# Patient Record
Sex: FEMALE | Race: WHITE
[De-identification: names, ages, dates, MRNs, and addresses within clinical notes are randomized per-mention and may not be internally consistent; named-entity substitution may affect disease eponyms.]

---

## 2017-11-14 ENCOUNTER — HOSPITAL ENCOUNTER (EMERGENCY)
Dept: HOSPITAL 9 - MADERS | Age: 37
Discharge: HOME | End: 2017-11-14
Payer: COMMERCIAL

## 2017-11-14 DIAGNOSIS — R11.2: Primary | ICD-10-CM

## 2017-11-14 DIAGNOSIS — K74.60: ICD-10-CM

## 2017-11-14 DIAGNOSIS — F31.9: ICD-10-CM

## 2017-11-14 DIAGNOSIS — F41.9: ICD-10-CM

## 2017-11-14 DIAGNOSIS — Z79.899: ICD-10-CM

## 2017-11-14 LAB
ALBUMIN SERPL BCG-MCNC: 3.5 G/DL (ref 3.5–5)
ALP SERPL-CCNC: 61 U/L (ref 40–150)
ALT SERPL W P-5'-P-CCNC: 18 U/L (ref 8–55)
AMYLASE SERPL-CCNC: 28 U/L (ref 25–125)
ANION GAP SERPL CALC-SCNC: 12 MMOL/L (ref 10–20)
APAP SERPL-MCNC: (no result) MCG/ML (ref 10–30)
AST SERPL-CCNC: 42 U/L (ref 5–34)
BACTERIA UR QL AUTO: (no result) HPF
BASOPHILS # BLD AUTO: 0 THOU/UL (ref 0–0.2)
BASOPHILS NFR BLD AUTO: 0.6 % (ref 0–1)
BILIRUB DIRECT SERPL-MCNC: 0.6 MG/DL (ref 0.1–0.3)
BILIRUB SERPL-MCNC: 1.5 MG/DL (ref 0.2–1.2)
BUN SERPL-MCNC: 11 MG/DL (ref 7–18.7)
CALCIUM SERPL-MCNC: 8.4 MG/DL (ref 7.8–10.44)
CHLORIDE SERPL-SCNC: 107 MMOL/L (ref 98–107)
CO2 SERPL-SCNC: 23 MMOL/L (ref 22–29)
CREAT CL PREDICTED SERPL C-G-VRATE: 0 ML/MIN (ref 70–130)
DRUG SCREEN CUTOFF: (no result)
EOSINOPHIL # BLD AUTO: 0 THOU/UL (ref 0–0.7)
EOSINOPHIL NFR BLD AUTO: 0.6 % (ref 0–10)
GLUCOSE SERPL-MCNC: 87 MG/DL (ref 70–105)
HGB BLD-MCNC: 12.6 G/DL (ref 12–16)
LIPASE SERPL-CCNC: 5 U/L (ref 8–78)
LYMPHOCYTES # BLD AUTO: 1.8 THOU/UL (ref 1.2–3.4)
LYMPHOCYTES NFR BLD AUTO: 22.1 % (ref 21–51)
MCH RBC QN AUTO: 31 PG (ref 27–31)
MCV RBC AUTO: 90.3 FL (ref 81–99)
MEDTOX CONTROL LINE VALID?: (no result)
MONOCYTES # BLD AUTO: 0.5 THOU/UL (ref 0.11–0.59)
MONOCYTES NFR BLD AUTO: 5.7 % (ref 0–10)
NEUTROPHILS # BLD AUTO: 5.8 THOU/UL (ref 1.4–6.5)
NEUTROPHILS NFR BLD AUTO: 71.1 % (ref 42–75)
PLATELET # BLD AUTO: 272 THOU/UL (ref 130–400)
POTASSIUM SERPL-SCNC: 3.1 MMOL/L (ref 3.5–5.1)
PREGU CONTROL BACKGROUND?: (no result)
PREGU CONTROL BAR APPEAR?: YES
PROT UR STRIP.AUTO-MCNC: 100 MG/DL
RBC # BLD AUTO: 4.08 MILL/UL (ref 4.2–5.4)
RBC UR QL AUTO: (no result) HPF (ref 0–3)
SALICYLATES SERPL-MCNC: (no result) MG/DL (ref 15–30)
SODIUM SERPL-SCNC: 139 MMOL/L (ref 136–145)
SP GR UR STRIP: 1.03 (ref 1–1.04)
WBC # BLD AUTO: 8.1 THOU/UL (ref 4.8–10.8)
WBC UR QL AUTO: (no result) HPF (ref 0–3)

## 2017-11-14 PROCEDURE — 96365 THER/PROPH/DIAG IV INF INIT: CPT

## 2017-11-14 PROCEDURE — 81015 MICROSCOPIC EXAM OF URINE: CPT

## 2017-11-14 PROCEDURE — 82150 ASSAY OF AMYLASE: CPT

## 2017-11-14 PROCEDURE — 81025 URINE PREGNANCY TEST: CPT

## 2017-11-14 PROCEDURE — 80076 HEPATIC FUNCTION PANEL: CPT

## 2017-11-14 PROCEDURE — 85025 COMPLETE CBC W/AUTO DIFF WBC: CPT

## 2017-11-14 PROCEDURE — 96375 TX/PRO/DX INJ NEW DRUG ADDON: CPT

## 2017-11-14 PROCEDURE — 80048 BASIC METABOLIC PNL TOTAL CA: CPT

## 2017-11-14 PROCEDURE — 80306 DRUG TEST PRSMV INSTRMNT: CPT

## 2017-11-14 PROCEDURE — 80307 DRUG TEST PRSMV CHEM ANLYZR: CPT

## 2017-11-14 PROCEDURE — 83690 ASSAY OF LIPASE: CPT

## 2017-11-14 PROCEDURE — 81003 URINALYSIS AUTO W/O SCOPE: CPT

## 2018-07-10 ENCOUNTER — HOSPITAL ENCOUNTER (EMERGENCY)
Dept: HOSPITAL 9 - MADERS | Age: 38
Discharge: HOME | End: 2018-07-10
Payer: SELF-PAY

## 2018-07-10 DIAGNOSIS — R50.9: Primary | ICD-10-CM

## 2018-07-10 DIAGNOSIS — Z79.899: ICD-10-CM

## 2018-07-10 DIAGNOSIS — F17.210: ICD-10-CM

## 2018-07-10 DIAGNOSIS — G43.909: ICD-10-CM

## 2018-07-10 DIAGNOSIS — F31.9: ICD-10-CM

## 2018-07-10 DIAGNOSIS — F41.9: ICD-10-CM

## 2018-07-10 LAB
BACTERIA UR QL AUTO: (no result) HPF
PREGU CONTROL BACKGROUND?: (no result)
PREGU CONTROL BAR APPEAR?: YES
SP GR UR STRIP: 1.01 (ref 1–1.03)
WBC UR QL AUTO: (no result) HPF (ref 0–3)

## 2018-07-10 PROCEDURE — 96372 THER/PROPH/DIAG INJ SC/IM: CPT

## 2018-07-10 PROCEDURE — 81003 URINALYSIS AUTO W/O SCOPE: CPT

## 2018-07-10 PROCEDURE — 81015 MICROSCOPIC EXAM OF URINE: CPT

## 2018-07-10 PROCEDURE — 81025 URINE PREGNANCY TEST: CPT

## 2018-07-10 PROCEDURE — 87804 INFLUENZA ASSAY W/OPTIC: CPT

## 2019-06-15 ENCOUNTER — HOSPITAL ENCOUNTER (INPATIENT)
Dept: HOSPITAL 92 - ERS | Age: 39
LOS: 3 days | Discharge: HOME | DRG: 872 | End: 2019-06-18
Attending: FAMILY MEDICINE | Admitting: FAMILY MEDICINE
Payer: SELF-PAY

## 2019-06-15 VITALS — BODY MASS INDEX: 31.1 KG/M2

## 2019-06-15 DIAGNOSIS — F31.9: ICD-10-CM

## 2019-06-15 DIAGNOSIS — B19.20: ICD-10-CM

## 2019-06-15 DIAGNOSIS — Z98.51: ICD-10-CM

## 2019-06-15 DIAGNOSIS — F41.9: ICD-10-CM

## 2019-06-15 DIAGNOSIS — Z71.6: ICD-10-CM

## 2019-06-15 DIAGNOSIS — K74.60: ICD-10-CM

## 2019-06-15 DIAGNOSIS — N12: ICD-10-CM

## 2019-06-15 DIAGNOSIS — F17.210: ICD-10-CM

## 2019-06-15 DIAGNOSIS — Z88.0: ICD-10-CM

## 2019-06-15 DIAGNOSIS — A41.9: Primary | ICD-10-CM

## 2019-06-15 DIAGNOSIS — Z90.49: ICD-10-CM

## 2019-06-15 DIAGNOSIS — E87.6: ICD-10-CM

## 2019-06-15 PROCEDURE — 96375 TX/PRO/DX INJ NEW DRUG ADDON: CPT

## 2019-06-15 PROCEDURE — 80053 COMPREHEN METABOLIC PANEL: CPT

## 2019-06-15 PROCEDURE — 83735 ASSAY OF MAGNESIUM: CPT

## 2019-06-15 PROCEDURE — 85027 COMPLETE CBC AUTOMATED: CPT

## 2019-06-15 PROCEDURE — 85025 COMPLETE CBC W/AUTO DIFF WBC: CPT

## 2019-06-15 PROCEDURE — 85007 BL SMEAR W/DIFF WBC COUNT: CPT

## 2019-06-15 PROCEDURE — 36415 COLL VENOUS BLD VENIPUNCTURE: CPT

## 2019-06-15 PROCEDURE — 80048 BASIC METABOLIC PNL TOTAL CA: CPT

## 2019-06-15 PROCEDURE — 80306 DRUG TEST PRSMV INSTRMNT: CPT

## 2019-06-15 PROCEDURE — 87086 URINE CULTURE/COLONY COUNT: CPT

## 2019-06-15 PROCEDURE — 80202 ASSAY OF VANCOMYCIN: CPT

## 2019-06-15 PROCEDURE — 96374 THER/PROPH/DIAG INJ IV PUSH: CPT

## 2019-06-16 LAB
ALBUMIN SERPL BCG-MCNC: 2.7 G/DL (ref 3.5–5)
ALP SERPL-CCNC: 151 U/L (ref 40–150)
ALT SERPL W P-5'-P-CCNC: 26 U/L (ref 8–55)
ANION GAP SERPL CALC-SCNC: 12 MMOL/L (ref 10–20)
AST SERPL-CCNC: 23 U/L (ref 5–34)
BILIRUB SERPL-MCNC: 0.6 MG/DL (ref 0.2–1.2)
BUN SERPL-MCNC: 10 MG/DL (ref 7–18.7)
CALCIUM SERPL-MCNC: 8 MG/DL (ref 7.8–10.44)
CHLORIDE SERPL-SCNC: 107 MMOL/L (ref 98–107)
CO2 SERPL-SCNC: 22 MMOL/L (ref 22–29)
CREAT CL PREDICTED SERPL C-G-VRATE: 145 ML/MIN (ref 70–130)
DRUG SCREEN CUTOFF: (no result)
GLOBULIN SER CALC-MCNC: 2.5 G/DL (ref 2.4–3.5)
GLUCOSE SERPL-MCNC: 115 MG/DL (ref 70–105)
HGB BLD-MCNC: 10.8 G/DL (ref 12–16)
MCH RBC QN AUTO: 31.2 PG (ref 27–31)
MCV RBC AUTO: 90.7 FL (ref 78–98)
MDIFF COMPLETE?: YES
MEDTOX CONTROL LINE VALID?: (no result)
MEDTOX READER #: (no result)
PLATELET # BLD AUTO: 124 THOU/UL (ref 130–400)
POTASSIUM SERPL-SCNC: 3.5 MMOL/L (ref 3.5–5.1)
RBC # BLD AUTO: 3.45 MILL/UL (ref 4.2–5.4)
SODIUM SERPL-SCNC: 137 MMOL/L (ref 136–145)
WBC # BLD AUTO: 15.2 THOU/UL (ref 4.8–10.8)

## 2019-06-16 RX ADMIN — HYDROCODONE BITARTRATE AND ACETAMINOPHEN PRN TAB: 5; 325 TABLET ORAL at 10:45

## 2019-06-16 RX ADMIN — HYDROCODONE BITARTRATE AND ACETAMINOPHEN PRN TAB: 5; 325 TABLET ORAL at 16:19

## 2019-06-16 RX ADMIN — VANCOMYCIN HYDROCHLORIDE SCH MLS: 1 INJECTION, POWDER, LYOPHILIZED, FOR SOLUTION INTRAVENOUS at 16:18

## 2019-06-16 RX ADMIN — HYDROCODONE BITARTRATE AND ACETAMINOPHEN PRN TAB: 5; 325 TABLET ORAL at 21:10

## 2019-06-16 RX ADMIN — VANCOMYCIN HYDROCHLORIDE SCH MLS: 1 INJECTION, POWDER, LYOPHILIZED, FOR SOLUTION INTRAVENOUS at 03:22

## 2019-06-16 RX ADMIN — Medication SCH ML: at 20:25

## 2019-06-16 RX ADMIN — Medication SCH ML: at 07:35

## 2019-06-16 NOTE — HP
PRIMARY CARE PROVIDER:  ENEIDA Ty



CHIEF COMPLAINT:  General body aches and flank pain.



HISTORY OF PRESENT ILLNESS:  This is a 38-year-old  female who initially

presented to an Emergency Department in [place], [place] on 06/14/2019, complaining

of the above symptoms, diagnosed with urinary tract infection and placed on

antibiotics.  The patient states she took 1 dose of the antibiotic, however,

continued to have fevers up to 103 degrees Fahrenheit with body aches, flank pain,

and cloudy urine.  The patient states she does not get frequent urinary tract

infections and thinks her last urinary tract infection was approximately 20 years

prior to this evaluation.  The patient denied any blood in the urine or change to

her bowel habits.  The patient denied any recent instrumentation, travel history, or

family members with similar symptoms.  The patient admits to pain in the bilateral

flank region with fever and difficulty passing urine.  The patient was evaluated at

[facility] on 06/15/2019, and underwent CT imaging of the

abdomen and pelvis showing bilateral perinephric stranding concerning for

pyelonephritis.  The patient also met sepsis criteria with fever and leukocytosis

and treated for sepsis due to urinary tract infection with IV Rocephin, Toradol, and

morphine sulfate.  The patient was transferred to [facility] Emergency

Department for further evaluation. 



PAST MEDICAL HISTORY:  

1. Bipolar disorder.

2. Anxiety disorder.

3. Tobacco abuse.

4. Hepatitis C with cirrhosis.

5. Migraine headaches.

6. Chronic sinusitis.

7. Polysubstance abuse by history.



PAST SURGICAL HISTORY:  

1. Status post bilateral tubal ligation.

2. Status post cholecystectomy.

3. Status post EGD/colonoscopy.

4. Status post liver biopsy.



CURRENT MEDICATIONS:  

1. Seroquel 300 mg p.o. daily.  

2. Xanax 2 mg p.o. daily.  

3. Fiorinal 50 mg/325 mg/40 mg one tablet p.o. q.6 hours p.r.n. headache.



ALLERGIES:  PENICILLIN.



FAMILY HISTORY:  No inheritable diseases per patient report.



SOCIAL HISTORY:  The patient resides outside of [place], [place].  Smokes up to a

pack of cigarettes daily.  Former drug use to include heroin, methamphetamines, and

opiates.  No alcohol use.  Accompanied by her mother in the emergency department. 



REVIEW OF SYSTEMS:  CONSTITUTIONAL:  Negative for weight loss or gain, ability to

conduct usual activities. 

SKIN:  Negative for rash, itching. 

EYES:  Negative for double vision, pain. 

ENT/MOUTH:  Negative for nose bleeding, neck stiffness, pain, tenderness. 

CARDIOVASCULAR:  Negative for palpitations, dyspnea on exertion, orthopnea. 

RESPIRATORY:  Negative for shortness of breath, wheezing, cough, hemoptysis, fever

or night sweats. 

GASTROINTESTINAL:  Negative for poor appetite, abdominal pain, heartburn, nausea,

vomiting, constipation, or diarrhea. 

GENITOURINARY:  Negative for urgency, frequency, dysuria, nocturia. 

MUSCULOSKELETAL:  Negative for pain, swelling. 

NEUROLOGIC/PSYCHIATRIC:  Negative for anxiety, depression. 

ALLERGY/IMMUNOLOGIC:  Negative for skin rash, bleeding tendency. 



Otherwise, negative except as stated per HPI.



PHYSICAL EXAMINATION:

VITAL SIGNS:  On admission; blood pressure 101/66, pulse 86, respiratory rate 20,

temperature 98.6 degrees Fahrenheit, O2 saturation 98% on room air. 

GENERAL APPEARANCE:  This is a 38-year-old  female, appears older than

stated age.  Alert and oriented x3, pleasant, in mild to moderate distress. 

HEENT:  Pupils are equal, round, and reactive to light and accommodation.

Extraocular muscles are intact.  No scleral icterus.  No conjunctival injection.

Nares patent.  OP is clear.  Oral mucosa is dry appearing. 

NECK:  Supple.  No cervical adenopathy.  No thyromegaly.  No carotid bruits.  No JVD

appreciated.  Cervical spine with full active and passive range of motion.  No

meningeal signs noted. 

CHEST:  Diminished breath sounds in the bases bilaterally.  Occasional expiratory

wheeze noted. 

CARDIOVASCULAR:  S1 and S2 without noted murmur, rub, or gallop.  ABDOMEN:  Rounded,

soft with mild tenderness to palpation in the bilateral flank region.  No palpable

mass.  No rebound or guarding noted.  Bowel sounds are positive in all 4 quadrants. 

EXTREMITIES:  Warm and dry with fair turgor.  No clubbing, cyanosis, or asymmetric

edema appreciated.  Pulses are palpable distally at the dorsalis pedis, posterior

tibial, and popliteal arteries bilaterally.  Capillary refill less than 2 seconds. 

NEUROLOGIC:  Cranial nerves II through XII are grossly intact.  No focal or

lateralizing signs appreciated. 



PERTINENT LAB AND X-RAY FINDINGS:  Basic metabolic profile within normal limits.

Lactic acid level 1.2.  AST 41, ALT of 34, alkaline phosphatase 187, albumin 3.1,

lipase 4.  CBC showed white blood cell count of 14.6, hemoglobin 12.6, hematocrit

37.3, platelet count 92 with 81% neutrophilia.  Urinalysis positive for protein,

ketones, and trace leukocyte esterase with greater than 50 to [id number]

wbc's per high-power field. 



ASSESSMENT AND PLAN:  

1. Sepsis secondary to pyelonephritis.  The patient will be admitted to the medical

floor.  We will continue general sepsis protocol.  Continue treatment as outlined in

#2.  Continue intravenous normal saline at 125 mL/h. 

2. Pyelonephritis suspected given the patient's CT imaging findings.  We will

continue Rocephin 2 g IV q.24 hours with additional vancomycin 1 g IV q.12 hours.

Urine and blood cultures are pending.  Pain control with Toradol 30 mg IV q.6 hours. 

3. Neutrophilic leukocytosis, secondary to #2.  We will repeat CBC in the a.m. and

continue management as outlined previously. 

4. Hepatitis C with cirrhosis, compensated currently.  We will continue serial

monitoring and monitor LFTs during the hospital course. 

5. Tobacco abuse.  Smoking cessation resources prior to discharge.

6. Bipolar disorder.  Resume Seroquel 300 mg daily with additional Xanax 2 mg daily.

7. Prophylaxis.  Sequential compression devices while in bed.  Pepcid 20 mg p.o.

b.i.d. 



CODE STATUS:  Full.  Surrogate medical decision maker is patient's mother.







Job ID:  [id number]

## 2019-06-17 LAB
ALBUMIN SERPL BCG-MCNC: 2.7 G/DL (ref 3.5–5)
ALP SERPL-CCNC: 172 U/L (ref 40–150)
ALT SERPL W P-5'-P-CCNC: 22 U/L (ref 8–55)
ANION GAP SERPL CALC-SCNC: 9 MMOL/L (ref 10–20)
AST SERPL-CCNC: 15 U/L (ref 5–34)
BASOPHILS # BLD AUTO: 0 THOU/UL (ref 0–0.2)
BASOPHILS NFR BLD AUTO: 0.3 % (ref 0–1)
BILIRUB SERPL-MCNC: 0.3 MG/DL (ref 0.2–1.2)
BUN SERPL-MCNC: 13 MG/DL (ref 7–18.7)
CALCIUM SERPL-MCNC: 8.1 MG/DL (ref 7.8–10.44)
CHLORIDE SERPL-SCNC: 112 MMOL/L (ref 98–107)
CO2 SERPL-SCNC: 21 MMOL/L (ref 22–29)
CREAT CL PREDICTED SERPL C-G-VRATE: 142 ML/MIN (ref 70–130)
DRUG SCREEN CUTOFF: (no result)
EOSINOPHIL # BLD AUTO: 0.2 THOU/UL (ref 0–0.7)
EOSINOPHIL NFR BLD AUTO: 1.3 % (ref 0–10)
GLOBULIN SER CALC-MCNC: 2.6 G/DL (ref 2.4–3.5)
GLUCOSE SERPL-MCNC: 146 MG/DL (ref 70–105)
HGB BLD-MCNC: 10.2 G/DL (ref 12–16)
LYMPHOCYTES # BLD: 2.4 THOU/UL (ref 1.2–3.4)
LYMPHOCYTES NFR BLD AUTO: 20.4 % (ref 21–51)
MAGNESIUM SERPL-MCNC: 1.6 MG/DL (ref 1.6–2.6)
MCH RBC QN AUTO: 29.7 PG (ref 27–31)
MCV RBC AUTO: 92.4 FL (ref 78–98)
MEDTOX CONTROL LINE VALID?: (no result)
MEDTOX READER #: (no result)
MONOCYTES # BLD AUTO: 1.1 THOU/UL (ref 0.11–0.59)
MONOCYTES NFR BLD AUTO: 9.3 % (ref 0–10)
NEUTROPHILS # BLD AUTO: 8.1 THOU/UL (ref 1.4–6.5)
NEUTROPHILS NFR BLD AUTO: 68.7 % (ref 42–75)
PLATELET # BLD AUTO: 117 THOU/UL (ref 130–400)
POTASSIUM SERPL-SCNC: 3.2 MMOL/L (ref 3.5–5.1)
RBC # BLD AUTO: 3.43 MILL/UL (ref 4.2–5.4)
SODIUM SERPL-SCNC: 139 MMOL/L (ref 136–145)
VANCOMYCIN TROUGH SERPL-MCNC: 11.5 UG/ML
WBC # BLD AUTO: 11.8 THOU/UL (ref 4.8–10.8)

## 2019-06-17 RX ADMIN — VANCOMYCIN HYDROCHLORIDE SCH: 1 INJECTION, POWDER, LYOPHILIZED, FOR SOLUTION INTRAVENOUS at 15:43

## 2019-06-17 RX ADMIN — Medication SCH: at 09:28

## 2019-06-17 RX ADMIN — HYDROCODONE BITARTRATE AND ACETAMINOPHEN PRN TAB: 5; 325 TABLET ORAL at 03:28

## 2019-06-17 RX ADMIN — Medication SCH: at 21:06

## 2019-06-17 RX ADMIN — HYDROCODONE BITARTRATE AND ACETAMINOPHEN PRN TAB: 5; 325 TABLET ORAL at 09:25

## 2019-06-17 RX ADMIN — SULFAMETHOXAZOLE AND TRIMETHOPRIM SCH TAB: 800; 160 TABLET ORAL at 20:47

## 2019-06-17 RX ADMIN — VANCOMYCIN HYDROCHLORIDE SCH MLS: 1 INJECTION, POWDER, LYOPHILIZED, FOR SOLUTION INTRAVENOUS at 03:28

## 2019-06-17 NOTE — PDOC.PN
- Subjective


Encounter Start Date: 06/17/19


Encounter Start Time: 07:20





Pt seen for followup re: UTI.  Says she feels better.  





- Objective


Resuscitation Status - Order Detail:





06/16/19 01:38


Resuscitation Status Routine 


   Resuscitation Status: FULL: Full Resuscitation








MAR Reviewed: Yes


Vital Signs & Weight: 


 Vital Signs (12 hours)











  Temp Pulse Resp BP Pulse Ox


 


 06/17/19 12:00  97.5 F L  71  12  111/72  94 L


 


 06/17/19 08:00  97.4 F L  74  12  133/86  100


 


 06/17/19 03:38  97.9 F  87  16  118/80  99








 Weight











Weight                         175 lb 9.6 oz














I&O: 


 











 06/16/19 06/17/19 06/18/19





 06:59 06:59 06:59


 


Intake Total 1338 1300 


 


Output Total 400 200 


 


Balance 938 1100 











Result Diagrams: 


 06/17/19 05:10





 06/17/19 05:10


Additional Labs: 





Labs reviewed by me





Phys Exam





- Physical Examination


Obese


HEENT: moist MMs, sclera anicteric, oral pharynx no lesions, 2+ tonsils


Neck: no nodes, no JVD, supple, full ROM


Respiratory: clear to auscultation bilateral


Cardiovascular: RRR, no rub


Gastrointestinal: soft, non-tender, no distention, positive bowel sounds


Neurological: moves all 4 limbs


Psychiatric: normal affect, A&O x 3





Dx/Plan


(1) UTI (urinary tract infection)


Status: Acute   Comment: switch to oral Bactrim DS for antibiotics, final urine 

culture is negative   





(2) Hypokalemia


Code(s): E87.6 - HYPOKALEMIA   Status: Acute   Comment: replace potassium   





(3) Bipolar disorder


Code(s): F31.9 - BIPOLAR DISORDER, UNSPECIFIED   Status: Chronic   Comment: 

stable, continue home medications   





- Plan





* .








Review of Systems





- Review of Systems


Constitutional: negative: fever, chills, sweats, weakness, malaise


Respiratory: negative: Cough, Shortness of Breath, SOB with Excertion, 

Pleuritic Pain, Wheezing


Cardiovascular: negative: chest pain, palpitations, orthopnea, paroxysmal 

nocturnal dyspnea, edema, light headedness


Gastrointestinal: negative: Nausea, Vomiting, Abdominal Pain, Diarrhea, 

Constipation, Melena, Hematochezia


Genitourinary: negative: Dysuria, Frequency, Incontinence, Hematuria, Retention


Musculoskeletal: Back Pain


Skin: negative: Rash, Lesions, Luis Alberto, Bruising





- Medications/Allergies


Allergies/Adverse Reactions: 


 Allergies











Allergy/AdvReac Type Severity Reaction Status Date / Time


 


Penicillins Allergy Intermediate Hives Verified 06/16/19 02:19











Medications: 


 Current Medications





Acetaminophen (Tylenol)  1,000 mg PO Q6H PRN


   PRN Reason: Mild Pain (1-3)


Alprazolam (Xanax)  2 mg PO HS [facility]


   Last Admin: 06/16/19 21:10 Dose:  2 mg


Famotidine (Pepcid)  20 mg PO BID [facility]


   Last Admin: 06/17/19 09:25 Dose:  20 mg


Hydralazine HCl (Apresoline)  10 mg SLOW IVP Q4H PRN


   PRN Reason: SBP > 180 and HR < 70


Ceftriaxone Sodium 2 gm/ (Sodium Chloride)  100 mls @ 200 mls/hr IVPB Q24HR [facility]


   Last Admin: 06/16/19 22:18 Dose:  100 mls


Sodium Chloride (Normal Saline 0.9%)  1,000 mls @ 125 mls/hr IV .Q8H [facility]


   Last Admin: 06/17/19 06:27 Dose:  1,000 mls


Vancomycin HCl 1.25 gm/ Sodium (Chloride)  250 mls @ 166.667 mls/hr IVPB 0300,

1500 [facility]


   Last Admin: 06/17/19 03:28 Dose:  250 mls


Ibuprofen (Motrin)  400 mg PO Q4H PRN


   PRN Reason: Fever > 101


Ketorolac Tromethamine (Toradol)  30 mg IVP Q6HR [facility]


   Stop: 06/21/19 06:01


   Last Admin: 06/17/19 11:28 Dose:  30 mg


Miscellaneous Medication (Pharmacy To Dose)  1 each IVPB PRN PRN


   PRN Reason: .


Nicotine (Nicoderm Patch)  14 mg TOP Q24HR [facility]


   Last Admin: 06/16/19 22:18 Dose:  14 mg


Non-Formulary Medication (Omeprazole [Omeprazole])  40 mg PO DAILY [facility]


Ondansetron HCl (Zofran Odt)  4 mg PO Q6H PRN


   PRN Reason: Nausea/Vomiting


Ondansetron HCl (Zofran)  4 mg IVP Q6H PRN


   PRN Reason: Nausea/Vomiting


Quetiapine Fumarate (Seroquel)  100 mg PO HS [facility]


   Last Admin: 06/16/19 20:22 Dose:  100 mg


Quetiapine Fumarate (Seroquel)  200 mg PO QAM [facility]


   Last Admin: 06/17/19 09:25 Dose:  200 mg


Quetiapine Fumarate (Seroquel)  100 mg PO HS [facility]


Quetiapine Fumarate (Seroquel)  200 mg PO DAILY [facility]


Saccharomyces Boulardii (Florastor)  250 mg PO HS [facility]


   Last Admin: 06/16/19 20:22 Dose:  250 mg


Sodium Chloride (Flush - Normal Saline)  10 ml IVF Q12HR [facility]


   Last Admin: 06/17/19 09:28 Dose:  Not Given


Sodium Chloride (Flush - Normal Saline)  10 ml IVF PRN PRN


   PRN Reason: Saline Flush


   Last Admin: 06/16/19 03:22 Dose:  10 ml

## 2019-06-18 VITALS — TEMPERATURE: 97.8 F | SYSTOLIC BLOOD PRESSURE: 143 MMHG | DIASTOLIC BLOOD PRESSURE: 84 MMHG

## 2019-06-18 LAB
ANION GAP SERPL CALC-SCNC: 10 MMOL/L (ref 10–20)
BASOPHILS # BLD AUTO: 0 THOU/UL (ref 0–0.2)
BASOPHILS NFR BLD AUTO: 0.2 % (ref 0–1)
BUN SERPL-MCNC: 8 MG/DL (ref 7–18.7)
CALCIUM SERPL-MCNC: 8.3 MG/DL (ref 7.8–10.44)
CHLORIDE SERPL-SCNC: 112 MMOL/L (ref 98–107)
CO2 SERPL-SCNC: 20 MMOL/L (ref 22–29)
CREAT CL PREDICTED SERPL C-G-VRATE: 138 ML/MIN (ref 70–130)
EOSINOPHIL # BLD AUTO: 0.2 THOU/UL (ref 0–0.7)
EOSINOPHIL NFR BLD AUTO: 2.1 % (ref 0–10)
GLUCOSE SERPL-MCNC: 103 MG/DL (ref 70–105)
HGB BLD-MCNC: 10 G/DL (ref 12–16)
LYMPHOCYTES # BLD: 2.2 THOU/UL (ref 1.2–3.4)
LYMPHOCYTES NFR BLD AUTO: 28.8 % (ref 21–51)
MCH RBC QN AUTO: 30.8 PG (ref 27–31)
MCV RBC AUTO: 92.1 FL (ref 78–98)
MONOCYTES # BLD AUTO: 0.7 THOU/UL (ref 0.11–0.59)
MONOCYTES NFR BLD AUTO: 8.8 % (ref 0–10)
NEUTROPHILS # BLD AUTO: 4.7 THOU/UL (ref 1.4–6.5)
NEUTROPHILS NFR BLD AUTO: 60.1 % (ref 42–75)
PLATELET # BLD AUTO: 222 THOU/UL (ref 130–400)
POTASSIUM SERPL-SCNC: 3.3 MMOL/L (ref 3.5–5.1)
RBC # BLD AUTO: 3.24 MILL/UL (ref 4.2–5.4)
SODIUM SERPL-SCNC: 139 MMOL/L (ref 136–145)
WBC # BLD AUTO: 7.8 THOU/UL (ref 4.8–10.8)

## 2019-06-18 RX ADMIN — Medication SCH: at 09:15

## 2019-06-18 RX ADMIN — SULFAMETHOXAZOLE AND TRIMETHOPRIM SCH TAB: 800; 160 TABLET ORAL at 09:17

## 2019-06-18 NOTE — DIS
DATE OF ADMISSION:  06/16/2019



DATE OF DISCHARGE:  06/18/2019



PRIMARY CARE PROVIDER:  Domitila Stallings.



DISCHARGE DIAGNOSES:  

1. Sepsis.

2. Pyelonephritis.



CONDITION OF PATIENT ON THE DAY OF DISCHARGE:  Stable.  I assessed Ms. Valvered on

the day of discharge.  She denies any chest pain or shortness of breath.  Vital

signs are stable.  S1 and S2 are heard, regular.  Lungs are clear to auscultation

bilaterally. 



DISCHARGE MEDICATIONS:  

1. Xanax 2 mg at bedtime.

2. Omeprazole 40 mg daily.

3. Seroquel 200 mg in the morning and 100 mg at bedtime.

4. Florastor 250 mg at bedtime for 12 days.

5. Bactrim DS one tablet two times a day for 12 days.



HOSPITAL COURSE:  Ms. Valverde is a pleasant 38-year-old lady who was admitted to

[facility] for sepsis and pyelonephritis on June 16, 2019.

Please refer to Dr. Quintana's history and physical note dated June 16, 2019, for

further details. 



She was treated with intravenous antibiotics and improved clinically.  Final urine

culture did not show any growth at 36 hours.  She is being discharged home on

empiric antibiotics in the form of Bactrim Double Strength one tablet 2 times a day. 



She is advised to follow up with her primary care provider in 3 to 5 days time.  She

is also advised to have her chem-7 checked at that time to ensure stability of

potassium and creatinine. 



On the day of discharge, Ms. Valverde has white count of 7800, hemoglobin 10,

platelet count 222,000, sodium 139, potassium 3.3, and creatinine 0.68. 



Please note that she had elevated alkaline phosphatase level during this

hospitalization.  This may need further workup as outpatient. 



Many thanks for allowing me to participate in your patient's care.  Please feel free

to contact me with any questions or concerns. 



DISCHARGE DESTINATION:  Home.



TIME SPENT:  Total amount of time spent coordinating this discharge:  33 minutes.







Job ID:  [id number]

## 2019-12-04 ENCOUNTER — HOSPITAL ENCOUNTER (EMERGENCY)
Dept: HOSPITAL 92 - ERS | Age: 39
LOS: 1 days | Discharge: HOME | End: 2019-12-05
Payer: SELF-PAY

## 2019-12-04 DIAGNOSIS — F41.9: ICD-10-CM

## 2019-12-04 DIAGNOSIS — F17.210: ICD-10-CM

## 2019-12-04 DIAGNOSIS — M51.26: Primary | ICD-10-CM

## 2019-12-04 DIAGNOSIS — Z79.899: ICD-10-CM

## 2019-12-04 DIAGNOSIS — F31.9: ICD-10-CM

## 2019-12-04 DIAGNOSIS — Z86.19: ICD-10-CM

## 2019-12-04 DIAGNOSIS — K74.60: ICD-10-CM

## 2019-12-04 PROCEDURE — 72141 MRI NECK SPINE W/O DYE: CPT

## 2019-12-04 PROCEDURE — 96361 HYDRATE IV INFUSION ADD-ON: CPT

## 2019-12-04 PROCEDURE — 72146 MRI CHEST SPINE W/O DYE: CPT

## 2019-12-04 PROCEDURE — 96374 THER/PROPH/DIAG INJ IV PUSH: CPT

## 2019-12-04 PROCEDURE — 72148 MRI LUMBAR SPINE W/O DYE: CPT

## 2019-12-04 PROCEDURE — 96375 TX/PRO/DX INJ NEW DRUG ADDON: CPT

## 2019-12-04 NOTE — MRI
Exam: Thoracic spine MRI without contrast



HISTORY: Acute pain. IV drug use. Evaluate for epidural abscess.



COMPARISON: None



FINDINGS:

Appropriate T1 marrow signal intensity of the thoracic vertebra. Thoracic spine vertebral body height
 is maintained. No fracture. No significant STIR hyperintensity to suggest vertebral body edema or

ligamentous injury

Visualized mediastinum, lung parenchyma and solid organs are grossly unremarkable.

The thoracic cord has a normal size and signal intensity. No cord expansion. No cord malacia. No abno
rmal T2 hyperintensity. Conus medullaris terminates at the T12-L1 disc space.

T4-T5: Minimal central disc protrusion. Minimal central canal stenosis

T7-T8: Central/right paracentral disc protrusion. Contact upon the thoracic cord with deformity the t
horacic cord. Mild to moderate central canal stenosis. No cord hyperintensity.

Throughout the thoracic spine, the neural foramina are patent



IMPRESSION:

1. No abnormal signal intensity of the vertebrae.

2. Degenerative disc disease at T4-T5 and T7-T8. There is mass effect upon the right hemicord at T7-T
8 without cord signal abnormality.

3. No abnormal signal intensities to suggest epidural abscess.



Transcribed Date/Time: 12/4/2019 11:48 PM



Reported By: Edna Thornton 

Electronically Signed:  12/4/2019 11:49 PM

## 2019-12-04 NOTE — MRI
MRI lumbar spine noncontrast:



HISTORY:

Acute pain. History of IV drug use. Evaluate for epidural abscess.



COMPARISON:

None



FINDINGS:



Appropriate T1 marrow signal intensity of the lumbar vertebrae. Lumbar spine vertebral body height is
 maintained. No fracture. No significant STIR hyperintensity to suggest vertebral body edema or

ligamentous injury.

Appropriate signal intensity of the  visualized paraspinal muscles.

T2 hyperintensity in the left renal cortex likely representing a 1.2 cm cyst

Conus medullaris terminates at the T12-L1 disc space



T12-L1:Adequate disc hydration. No significant central canal stenosis or significant neural foraminal
 narrowing



L1-L2:Adequate disc hydration. No significant central canal stenosis or significant neural foraminal 
narrowing



L2-L3:Adequate disc hydration. No significant central canal stenosis or significant neural foraminal 
narrowing



L3-L4:Adequate disc hydration. Broad-based disc bulge, minimal ligament flavum thickening and facet h
ypertrophy result in mild central canal stenosis. Bilaterally the neural foramina are patent



L4-L5: Desiccation : with mild loss of disc space height. There appears to be a central and  left sub
articular disc herniation with associated T2 hyperintensity. T2 hyperintensity may in part

represent an annular fissure. The presence of T2 hyperintensity involving the herniated disc material
 does raises the possibility acute herniation. Ligament flavum thickening and facet hypertrophy are

present. Overall there is moderate central canal stenosis. Disc material does extend into the right s
ubarticular zone and abuts the traversing right L5 nerve root. There is near complete obscuration

of the traversing left L5 nerve root due to disc material. Mild bilateral neural foraminal narrowing 
due to disc material.



L5-S1:Adequate disc hydration. No significant central canal stenosis or significant neural foraminal 
narrowing



Incidental S2 Tarlov cyst.



IMPRESSION:

1. Annular fissure with acute herniation at L4-L5. Moderate central canal stenosis. Mass effect witho
ut obscuration of the traversing right L5 nerve root. Mass effect and near complete obscuration the

traversing left L5 nerve root.

2. No evidence of epidural abscess.



Transcribed Date/Time: 12/4/2019 11:47 PM



Reported By: Edna Thornton 

Electronically Signed:  12/4/2019 11:47 PM

## 2019-12-04 NOTE — MRI
Exam: MRI cervical spine without contrast



HISTORY: Pain. Evaluate for epidural abscess. IV drug abuse..



COMPARISON: None



FINDINGS:

 Appropriate T1 marrow signal intensity of the cervical vertebrae. Vertebral body height is maintaine
d. No fracture. No significant STIR hyperintensity to suggest vertebral body edema or ligamentous

injury. Straightening of normal cervical lordosis is presumed be positional



Visualized brain parenchyma, cervicomedullary junction, cervical cord and the upper thoracic cord hav
e a normal size and signal intensity.



C2-C3: Central disc protrusion. No significant central canal stenosis or significant neural foraminal
 narrowing



C3-C4: Broad-based disc bulge with a small central disc protrusion. No significant central canal sten
osis or significant neural foraminal narrowing.



C4-C5:Broad-based disc  osteophyte complex abuts the thecal sac and nearly effaces the ventral subara
chnoid space. Mild central canal stenosis. Mild bilateral foraminal narrowing due to uncovertebral

hypertrophy



C5-C6: Broad-based disc osteophyte complex abuts the thecal sac and nearly effaces the ventral subara
chnoid space. Mild central canal stenosis. Minimal bilateral foraminal narrowing due to

uncovertebral hypertrophy



C6-C7: Broad-based discussed by complex nearly effaces subarachnoid space. Mild central canal stenosi
s. Right neural foramen is patent. Mild left foraminal narrowing due to uncovertebral hypertrophy



C7-T1: No significant central canal stenosis or significant neural foraminal narrowing



IMPRESSION:

 1. Degenerative changes of the cervical spine as described above

2. Appropriate marrow signal intensity of the cervical vertebra.

3. No evidence of epidural abscess..



Transcribed Date/Time: 12/4/2019 11:51 PM



Reported By: Edna Thornton 

Electronically Signed:  12/4/2019 11:56 PM

## 2020-03-17 ENCOUNTER — HOSPITAL ENCOUNTER (INPATIENT)
Dept: HOSPITAL 92 - ERS | Age: 40
LOS: 1 days | Discharge: HOME | DRG: 872 | End: 2020-03-18
Attending: INTERNAL MEDICINE | Admitting: INTERNAL MEDICINE
Payer: SELF-PAY

## 2020-03-17 VITALS — BODY MASS INDEX: 15.9 KG/M2

## 2020-03-17 DIAGNOSIS — I10: ICD-10-CM

## 2020-03-17 DIAGNOSIS — K74.60: ICD-10-CM

## 2020-03-17 DIAGNOSIS — F15.10: ICD-10-CM

## 2020-03-17 DIAGNOSIS — Z98.51: ICD-10-CM

## 2020-03-17 DIAGNOSIS — Z88.0: ICD-10-CM

## 2020-03-17 DIAGNOSIS — F11.10: ICD-10-CM

## 2020-03-17 DIAGNOSIS — Z71.6: ICD-10-CM

## 2020-03-17 DIAGNOSIS — N39.0: ICD-10-CM

## 2020-03-17 DIAGNOSIS — Z90.49: ICD-10-CM

## 2020-03-17 DIAGNOSIS — F31.9: ICD-10-CM

## 2020-03-17 DIAGNOSIS — N17.9: ICD-10-CM

## 2020-03-17 DIAGNOSIS — F41.9: ICD-10-CM

## 2020-03-17 DIAGNOSIS — E87.2: ICD-10-CM

## 2020-03-17 DIAGNOSIS — F17.210: ICD-10-CM

## 2020-03-17 DIAGNOSIS — A41.9: Primary | ICD-10-CM

## 2020-03-17 DIAGNOSIS — R65.20: ICD-10-CM

## 2020-03-17 DIAGNOSIS — B19.20: ICD-10-CM

## 2020-03-17 DIAGNOSIS — Z71.51: ICD-10-CM

## 2020-03-17 DIAGNOSIS — G43.909: ICD-10-CM

## 2020-03-17 PROCEDURE — 83605 ASSAY OF LACTIC ACID: CPT

## 2020-03-17 PROCEDURE — 80048 BASIC METABOLIC PNL TOTAL CA: CPT

## 2020-03-17 PROCEDURE — 36415 COLL VENOUS BLD VENIPUNCTURE: CPT

## 2020-03-17 PROCEDURE — 99284 EMERGENCY DEPT VISIT MOD MDM: CPT

## 2020-03-17 PROCEDURE — 85025 COMPLETE CBC W/AUTO DIFF WBC: CPT

## 2020-03-17 PROCEDURE — 87086 URINE CULTURE/COLONY COUNT: CPT

## 2020-03-17 NOTE — HP
PRIMARY CARE PHYSICIAN:  Domitila Stallings, family nurse practitioner.



REASON FOR ADMISSION:  Transferred from [facility] for 
hypotension

and urinary tract infection. 



HISTORY OF PRESENT ILLNESS:  A 39-year-old female, who has underlying history of

anxiety disorder and polysubstance abuse as well as bipolar disorder, who was

brought to [facility] for dizziness.  As per the patient, her 
blood

pressure was very low at home.  The patient was also feeling dizzy and 
lightheaded.

She was also having poor appetite and she was feeling a little bit discomfort 
in her

bladder area.  The patient's blood pressure at home was about 73/34 and 
subsequently

Paramedics was brought to [facility].  At that time, the 
patient

blood pressure was 103/69, and she was tachycardic and slightly tachypneic.  Her

routine analysis showed urinary tract infection.  The patient was not having any

dysuria or increased frequency, but she was having mild lower abdominal 
discomfort.

The patient denies any fever or chills.  The patient denies any nausea or 
vomiting.

At [facility], she was given total 4 L of IV fluid and 
subsequently

her blood pressure improved, and she was also given Rocephin which she did 
tolerate

very well.  The patient also had elevated lactic acid and her chest x-ray was

normal.  When she arrived to our emergency room, her blood pressure was normal.
  She

was afebrile.  We are admitting this patient for antibiotic therapy and 
followup on

culture result. 



REVIEW OF SYSTEMS:  CONSTITUTIONAL:  Negative for weight loss or gain, ability 
to

conduct usual activities. 

SKIN:  Negative for rash, itching. 

EYES:  Negative for double vision, pain. 

ENT/MOUTH:  Negative for nose bleeding, neck stiffness, pain, tenderness. 

CARDIOVASCULAR:  Negative for palpitations, dyspnea on exertion, orthopnea. 

RESPIRATORY:  Negative for shortness of breath, wheezing, cough, hemoptysis, 
fever

or night sweats. 

GASTROINTESTINAL:  Negative for poor appetite, abdominal pain, heartburn, nausea
,

vomiting, constipation, or diarrhea. 

GENITOURINARY:  Negative for urgency, frequency, dysuria, nocturia. 

MUSCULOSKELETAL:  Negative for pain, swelling. 

NEUROLOGIC/PSYCHIATRIC:  Negative for anxiety, depression. 

ALLERGY/IMMUNOLOGIC:  Negative for skin rash, bleeding tendency. 

All review of systems reviewed with her and negative except as mentioned in HPI.



PAST MEDICAL HISTORY:  

1. History of hepatitis C with cirrhosis.

2. Migraine headache.

3. Chronic sinusitis.

4. Polysubstance abuse.

5. Tobacco abuse.



PAST PSYCHIATRIC HISTORY:  

1. Anxiety disorder.

2. Bipolar disorder.



PAST SURGICAL HISTORY:  

1. Bilateral tubal ligation.

2. Cholecystectomy.

3. EGD.

4. Colonoscopy.

5. Liver biopsy.



CURRENT HOME MEDICATIONS:  

1. Seroquel 300 mg p.o. daily.

2. Xanax 2 mg p.o. daily.

 

ALLERGIES:  PENICILLIN, BUT THE PATIENT TOLERATED ROCEPHIN VERY WELL WITHOUT ANY

SIDE EFFECTS. 



FAMILY HISTORY:  No strong family history of premature coronary artery disease,

stroke, or cancer. 



SOCIAL HISTORY:  The patient is living around [facility].  She smokes 
about 1

pack per day.  She abuses methamphetamine and opiates.  The patient denies any

alcohol abuse. 



EMERGENCY ROOM COURSE:  The patient is given Rocephin, Toradol, IV fluid 4 L at

[facility]. 



PHYSICAL EXAMINATION:

VITAL SIGNS:  Currently, blood pressure 112/71, pulse 72, respiratory rate 18,

temperature 97.8, saturation 100% on room air, weight 89.3 kg. 

GENERAL:  The patient is currently alert, awake, no acute distress. 

HEAD:  Normocephalic and atraumatic. 

NECK:  Supple.  No JVD.  No meningeal signs of irritation. 

LUNGS:  Clear to auscultation without any rhonchi or rales. 

CARDIAC:  S1-S2 regular.  No murmur, no gallop, no rub. 

ABDOMEN:  Soft, bowel sounds present, nontender, nondistended.  No 
organomegaly.  No

mass. 

EXTREMITIES:  No edema.  Good distal pulsation. 

SKIN:  No skin rash. 

HEMATOLOGIC:  No lymphadenopathy. 

NEUROLOGIC:  Nonfocal examination.



SIGNIFICANT LABORATORY DATA:  CBC:  WBC 11.0, hemoglobin 13.8, platelet 61. 



BMP:  Sodium 137, potassium 3.8, chloride 101, carbon dioxide 19, anion gap 21, 
BUN

35, creatinine 2.32, glucose 89, calcium 9.1.  Lactic acid   2.7.

Creatinine improved to 1.7. 



LFT:  AST 22, ALT 29, alkaline phosphatase 117, albumin 3, albumin 4.3.  
Troponin

negative. 



Urinalysis consistent with urinary tract infection.  Urine drug screen positive 
for

amphetamine, methamphetamine, tricyclic, and opiates.  Serum drug screen 
negative. 



IMAGING STUDIES:  Telemetry showing normal sinus rhythm. 



Chest x-ray showing no acute cardiopulmonary process.



ASSESSMENT:  

1. Hypotension, likely related with sepsis secondary to urinary tract infection.

2. Lactic acidosis due to sepsis.

3. Sepsis due to urinary tract infection.

4. Polysubstance abuse.

5. Sepsis with acute organ dysfunction with acute kidney injury.

6. Acute kidney failure, prerenal etiology.

7. History of anxiety disorder/bipolar disorder.



PLAN:  

1. Admission to medical floor.  Continue IV fluid NS at 100 mL/h.  Monitor renal

function.  Follow up on urine culture results.  Continue Rocephin 1 g q.24 
hours.

Counseling given to avoid smoking as well as polysubstance abuse.  We will 
monitor

while in hospital for any hemodynamic compromise.  We will repeat labs 
tomorrow.  We

will restart selected medication while in hospital.  Plan of care discussed 
with the

patient in detail. 

2. Deep venous thrombosis prophylaxis, SCD boots.  No Lovenox because of low

platelet count. 

3. GI prophylaxis, Pepcid 20 mg p.o. b.i.d.



CODE STATUS:  The patient is full code.



DISPOSITION PLAN:  Based on clinical course.  We are expecting patient's stay in

hospital more than 2 midnights.  Plan of care discussed with the patient in 
detail. 







Job ID:  [id number]



MTDD

## 2020-03-18 VITALS — TEMPERATURE: 98 F | DIASTOLIC BLOOD PRESSURE: 90 MMHG | SYSTOLIC BLOOD PRESSURE: 142 MMHG

## 2020-03-18 LAB
ANION GAP SERPL CALC-SCNC: 11 MMOL/L (ref 10–20)
ANION GAP SERPL CALC-SCNC: 12 MMOL/L (ref 10–20)
BASOPHILS # BLD AUTO: 0 THOU/UL (ref 0–0.2)
BASOPHILS NFR BLD AUTO: 0.4 % (ref 0–1)
BUN SERPL-MCNC: 16 MG/DL (ref 7–18.7)
BUN SERPL-MCNC: 18 MG/DL (ref 7–18.7)
CALCIUM SERPL-MCNC: 7.9 MG/DL (ref 7.8–10.44)
CALCIUM SERPL-MCNC: 8 MG/DL (ref 7.8–10.44)
CHLORIDE SERPL-SCNC: 112 MMOL/L (ref 98–107)
CHLORIDE SERPL-SCNC: 113 MMOL/L (ref 98–107)
CO2 SERPL-SCNC: 17 MMOL/L (ref 22–29)
CO2 SERPL-SCNC: 20 MMOL/L (ref 22–29)
CREAT CL PREDICTED SERPL C-G-VRATE: 125 ML/MIN (ref 70–130)
CREAT CL PREDICTED SERPL C-G-VRATE: 53 ML/MIN (ref 70–130)
EOSINOPHIL # BLD AUTO: 0.1 THOU/UL (ref 0–0.7)
EOSINOPHIL NFR BLD AUTO: 2.3 % (ref 0–10)
GLUCOSE SERPL-MCNC: 105 MG/DL (ref 70–105)
GLUCOSE SERPL-MCNC: 122 MG/DL (ref 70–105)
HGB BLD-MCNC: 11.2 G/DL (ref 12–16)
HGB BLD-MCNC: 11.8 G/DL (ref 12–16)
LYMPHOCYTES # BLD: 2.1 THOU/UL (ref 1.2–3.4)
LYMPHOCYTES NFR BLD AUTO: 48 % (ref 21–51)
MCH RBC QN AUTO: 32.2 PG (ref 27–31)
MCH RBC QN AUTO: 32.5 PG (ref 27–31)
MCV RBC AUTO: 96.2 FL (ref 78–98)
MCV RBC AUTO: 96.5 FL (ref 78–98)
MONOCYTES # BLD AUTO: 0.3 THOU/UL (ref 0.11–0.59)
MONOCYTES NFR BLD AUTO: 6.9 % (ref 0–10)
NEUTROPHILS # BLD AUTO: 1.9 THOU/UL (ref 1.4–6.5)
NEUTROPHILS NFR BLD AUTO: 42.4 % (ref 42–75)
PLATELET # BLD AUTO: 177 THOU/UL (ref 130–400)
PLATELET # BLD AUTO: 187 THOU/UL (ref 130–400)
POTASSIUM SERPL-SCNC: 3.7 MMOL/L (ref 3.5–5.1)
POTASSIUM SERPL-SCNC: 4 MMOL/L (ref 3.5–5.1)
RBC # BLD AUTO: 3.45 MILL/UL (ref 4.2–5.4)
RBC # BLD AUTO: 3.65 MILL/UL (ref 4.2–5.4)
SODIUM SERPL-SCNC: 138 MMOL/L (ref 136–145)
SODIUM SERPL-SCNC: 139 MMOL/L (ref 136–145)
WBC # BLD AUTO: 4.5 THOU/UL (ref 4.8–10.8)
WBC # BLD AUTO: 5.2 THOU/UL (ref 4.8–10.8)

## 2020-03-20 NOTE — DIS
DATE OF ADMISSION:  03/17/2020



DATE OF DISCHARGE:  03/18/2020



DISCHARGE DIAGNOSES:  Sepsis secondary to urinary tract infection, leukopenia, 
and

anemia. 



CONSULTATIONS:  None.



BRIEF HISTORY OF PRESENT ILLNESS:  This is a 39-year-old female with past 
medical

history of polysubstance abuse, anxiety disorder, and bipolar, who presented to 
the

emergency room with dizziness.  Her blood pressure at home was 73/34. She was 
brought to [facility].  She was noted to be

tachycardic and tachypneic.  Her labs were consistent with the UTI.  The 
patient was

given 4 L of IV fluids due to elevated lactic acid and was given ceftriaxone, 
and transferred to our emergency room. 



HOSPITAL COURSE:

  Sepsis secondary to UTI:  The patient had repeat labs done here

which showed resolution of her lactic acid.  Her urine culture showed no growth.

However, this was done after the patient already received antibiotics.  Blood

cultures were not done at [facility] for unclear reason, however, were not

repeated here since the patient had already received antibiotics and clinically 
had

improved.  She did have a chest x-ray, which showed no acute disease.  She was

discharged with cefdinir for 6 more days to complete a 7-day course of 
antibiotics. 



Polysubstance abuse:  The patient was advised to quit all substance use.



DISCHARGE PHYSICAL EXAMINATION:

  VITAL SIGNS:  Temperature 98, heart rate 71,

respiratory rate 20, O2 saturation 97% on room air, blood pressure 142/90. 

GENERAL:  The patient is alert, awake, oriented x3. 

CVS:  Regular rate and rhythm with no murmurs, rubs, or gallops. 

LUNGS:  Clear to auscultation bilaterally. 

ABDOMEN:  Positive bowel sounds, soft, nontender, nondistended. 

EXTREMITIES:  No edema.



PERTINENT LABORATORY DATA:  

CBC on 03/18:  White count 5.2, hemoglobin 11.8,

hematocrit 35.2. 

BMP on 03/18:  Normal. 

Chest x-ray:  No acute disease.



DISCHARGE CONDITION:  Stable.

ACTIVITY:  As tolerated.

DIET:  Regular diet.



DISCHARGE MEDICATIONS:  

New prescriptions:  Cefdinir 300 mg p.o. q.12 hours. 

All other home medications were resumed.



DISCHARGE INSTRUCTIONS:  The patient to follow up with her PCP in a week.  She

should take cefdinir for 6 more days to complete a 7-day course of antibiotics.
  She

should have repeat CBC to follow up her anemia and to have this further worked 
up as

an outpatient. 







Job ID:  [id number]



EM

## 2020-03-20 NOTE — PQF
Corina Valverde SALIM NOORJIBHAI MD

[id number]                                                             

[id number]                             

                                   

CLINICAL DOCUMENTATION CLARIFICATION FORM:  POST DISCHARGE



Addendum to original discharge summary date:  __________________________________
____



Late entry note date:  _________________________________________________________
__











DATE:03/20/2020                                       ATTN:MARÍA ELENA PEMBERTON MD



Please exercise your independent, professional judgment in responding to the 
clarification form. 

Clinical indicators are provided on the bottom of this form for your review





Please check appropriate box(s):



Kindly clarify the Sepsis with acute organ dysfunction with acute kidney injury



[  ]  Severe sepsis with Septic shock 

[ x ]  Severe sepsis without Septic shock 

[  ]  Other diagnosis ___________

[  ]  Unable to determine





For continuity of documentation, please document condition throughout progress 
notes and discharge summary.  Thank You.





CLINICAL INDICATORS - SIGNS / SYMPTOMS / LABS



Hypotension likely related with sepsis secondary to urinary tract infection-
Documented in H&P on 03/17 by María Elena Pemberton

Sepsis with acute organ dysfunction with acute kidney injury-Documented in H&P 
on 03/17 by María Elena Pemberton



RISK FACTORS

Sepsis with acute organ dysfunction with acute kidney injury-Documented in H&P 
on 03/17 by María Elena Pemberton





TREATMENTS:

She was given 4 L of IV fluid and subsequently her blood pressure improved-
Documented in H&P on 03/17 by María Elena Pemberton

Rocephin 1 gm  IV -Documented in Medication snapshot

[employer] Win[employer] Viewer









(This form is maintained as a part of the permanent medical record)

2015 [email or web address].  All Rights Reserved

[phone]
[phone]

                                                              



 





EM